# Patient Record
Sex: MALE | Race: WHITE | NOT HISPANIC OR LATINO
[De-identification: names, ages, dates, MRNs, and addresses within clinical notes are randomized per-mention and may not be internally consistent; named-entity substitution may affect disease eponyms.]

---

## 2021-02-05 PROBLEM — Z00.00 ENCOUNTER FOR PREVENTIVE HEALTH EXAMINATION: Status: ACTIVE | Noted: 2021-02-05

## 2021-02-09 ENCOUNTER — NON-APPOINTMENT (OUTPATIENT)
Age: 36
End: 2021-02-09

## 2021-02-10 ENCOUNTER — APPOINTMENT (OUTPATIENT)
Dept: CARDIOLOGY | Facility: CLINIC | Age: 36
End: 2021-02-10
Payer: COMMERCIAL

## 2021-02-10 ENCOUNTER — NON-APPOINTMENT (OUTPATIENT)
Age: 36
End: 2021-02-10

## 2021-02-10 ENCOUNTER — TRANSCRIPTION ENCOUNTER (OUTPATIENT)
Age: 36
End: 2021-02-10

## 2021-02-10 VITALS
SYSTOLIC BLOOD PRESSURE: 112 MMHG | DIASTOLIC BLOOD PRESSURE: 76 MMHG | BODY MASS INDEX: 26.9 KG/M2 | HEART RATE: 63 BPM | TEMPERATURE: 97.7 F | HEIGHT: 73 IN | WEIGHT: 203 LBS

## 2021-02-10 VITALS — SYSTOLIC BLOOD PRESSURE: 118 MMHG | DIASTOLIC BLOOD PRESSURE: 80 MMHG

## 2021-02-10 DIAGNOSIS — Z82.49 FAMILY HISTORY OF ISCHEMIC HEART DISEASE AND OTHER DISEASES OF THE CIRCULATORY SYSTEM: ICD-10-CM

## 2021-02-10 DIAGNOSIS — Z78.9 OTHER SPECIFIED HEALTH STATUS: ICD-10-CM

## 2021-02-10 DIAGNOSIS — Z86.59 PERSONAL HISTORY OF OTHER MENTAL AND BEHAVIORAL DISORDERS: ICD-10-CM

## 2021-02-10 PROCEDURE — 36415 COLL VENOUS BLD VENIPUNCTURE: CPT

## 2021-02-10 PROCEDURE — 99072 ADDL SUPL MATRL&STAF TM PHE: CPT

## 2021-02-10 PROCEDURE — 93000 ELECTROCARDIOGRAM COMPLETE: CPT | Mod: 59

## 2021-02-10 PROCEDURE — 99205 OFFICE O/P NEW HI 60 MIN: CPT

## 2021-02-10 PROCEDURE — 93246 EXT ECG>7D<15D RECORDING: CPT

## 2021-02-10 RX ORDER — BECLOMETHASONE DIPROPIONATE HFA 40 UG/1
40 AEROSOL, METERED RESPIRATORY (INHALATION)
Refills: 0 | Status: ACTIVE | COMMUNITY
Start: 2021-02-10

## 2021-02-10 RX ORDER — FINASTERIDE 1 MG/1
1 TABLET ORAL DAILY
Refills: 0 | Status: ACTIVE | COMMUNITY
Start: 2021-02-10

## 2021-02-10 NOTE — DISCUSSION/SUMMARY
[FreeTextEntry1] : .Brief recommendations and follow-up: (see above for details)\par \par The patient is describing palpitation which is not confirmed to be associated with a true arrhythmia.\par Work-up will commence with an event monitor.\par The patient will make efforts to obtain prior records.\par Comprehensive blood work to be drawn today.\par Further recommendations will follow.

## 2021-02-10 NOTE — HISTORY OF PRESENT ILLNESS
[FreeTextEntry1] : This 35 year-old male patient presents for cardiovascular evaluation.\par \par His problem list is as noted above.\par \par Patient presents for his first visit in many years, perhaps more than 10.\par \par He originally presented at the age of 24 with episodic palpitation.  At first the arrhythmia could not be detected but he then had an episode that produced lightheadedness and he presented to [facility] where supraventricular tachycardia was found.  Patient was transferred to [facility] and underwent electrophysiologic evaluation.  I have obtained the report dated 3/29/2010.  The patient had evidence of supraventricular tachycardia at a rate of 140 bpm.  He underwent ablation of the slow pathway.  Interestingly he also had a second arrhythmia termed an accelerated junctional rhythm.  He was treated briefly after the procedure with a beta-blocker but this was then discontinued.\par \par Over the years the patient is actually done very well.  He has noted episodic palpitation and has been under the care of a cardiologist when he was living in [facility].  He had testing that included a stress test that was evidently satisfactory with no evidence of recurrent arrhythmias.  By recollection the patient also had an echocardiogram that showed normal findings.  This report is not yet available for my review.\par \par Over the past year the patient has noted episodic palpitation that has been short-lived.  Over the past month however he has noted this almost daily.  Of note is that he has taken his pulse during the symptom and it was "normal".  A true arrhythmia that correlates with his symptomatology is not confirmed.\par \par The patient is concerned and now presents for consultation.\par \par The patient has been physically very fit.  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  There are no exertional symptoms.\par \par He is seen at the patient, family, and primary care physician request.

## 2021-02-10 NOTE — ASSESSMENT
[FreeTextEntry1] : EKG 2/10/2021.  Sinus rhythm.  Minor right-sided conduction delay.  Otherwise within normal limits.

## 2021-02-10 NOTE — PHYSICAL EXAM
[General Appearance - Well Developed] : well developed [Normal Appearance] : normal appearance [Well Groomed] : well groomed [General Appearance - Well Nourished] : well nourished [No Deformities] : no deformities [General Appearance - In No Acute Distress] : no acute distress [Normal Conjunctiva] : the conjunctiva exhibited no abnormalities [Eyelids - No Xanthelasma] : the eyelids demonstrated no xanthelasmas [Normal Oral Mucosa] : normal oral mucosa [No Oral Pallor] : no oral pallor [No Oral Cyanosis] : no oral cyanosis [Normal Jugular Venous A Waves Present] : normal jugular venous A waves present [Normal Jugular Venous V Waves Present] : normal jugular venous V waves present [No Jugular Venous Valdez A Waves] : no jugular venous valdez A waves [Heart Rate And Rhythm] : heart rate and rhythm were normal [Heart Sounds] : normal S1 and S2 [Murmurs] : no murmurs present [Respiration, Rhythm And Depth] : normal respiratory rhythm and effort [Exaggerated Use Of Accessory Muscles For Inspiration] : no accessory muscle use [Auscultation Breath Sounds / Voice Sounds] : lungs were clear to auscultation bilaterally [Abdomen Tenderness] : non-tender [Abdomen Soft] : soft [Abdomen Mass (___ Cm)] : no abdominal mass palpated [Abnormal Walk] : normal gait [Gait - Sufficient For Exercise Testing] : the gait was sufficient for exercise testing [Nail Clubbing] : no clubbing of the fingernails [Cyanosis, Localized] : no localized cyanosis [Petechial Hemorrhages (___cm)] : no petechial hemorrhages [] : no rash [Skin Color & Pigmentation] : normal skin color and pigmentation [No Venous Stasis] : no venous stasis [Skin Lesions] : no skin lesions [No Skin Ulcers] : no skin ulcer [No Xanthoma] : no  xanthoma was observed [Oriented To Time, Place, And Person] : oriented to person, place, and time [Affect] : the affect was normal [Mood] : the mood was normal [No Anxiety] : not feeling anxious

## 2021-02-11 LAB
ALBUMIN SERPL ELPH-MCNC: 4.7 G/DL
ALP BLD-CCNC: 82 U/L
ALT SERPL-CCNC: 18 U/L
ANION GAP SERPL CALC-SCNC: 11 MMOL/L
AST SERPL-CCNC: 18 U/L
BASOPHILS # BLD AUTO: 0.04 K/UL
BASOPHILS NFR BLD AUTO: 0.8 %
BILIRUB SERPL-MCNC: 0.3 MG/DL
BUN SERPL-MCNC: 19 MG/DL
CALCIUM SERPL-MCNC: 9.9 MG/DL
CHLORIDE SERPL-SCNC: 101 MMOL/L
CHOLEST SERPL-MCNC: 174 MG/DL
CK SERPL-CCNC: 139 U/L
CO2 SERPL-SCNC: 27 MMOL/L
CREAT SERPL-MCNC: 1.07 MG/DL
EOSINOPHIL # BLD AUTO: 0.2 K/UL
EOSINOPHIL NFR BLD AUTO: 3.9 %
FT4I SERPL CALC-MCNC: 6.7 INDEX
GLUCOSE SERPL-MCNC: 86 MG/DL
HCT VFR BLD CALC: 45.5 %
HDLC SERPL-MCNC: 53 MG/DL
HGB BLD-MCNC: 15.3 G/DL
IMM GRANULOCYTES NFR BLD AUTO: 0.2 %
LDLC SERPL CALC-MCNC: 97 MG/DL
LYMPHOCYTES # BLD AUTO: 2.58 K/UL
LYMPHOCYTES NFR BLD AUTO: 49.8 %
MAGNESIUM SERPL-MCNC: 2.1 MG/DL
MAN DIFF?: NORMAL
MCHC RBC-ENTMCNC: 32.3 PG
MCHC RBC-ENTMCNC: 33.6 GM/DL
MCV RBC AUTO: 96.2 FL
MONOCYTES # BLD AUTO: 0.43 K/UL
MONOCYTES NFR BLD AUTO: 8.3 %
NEUTROPHILS # BLD AUTO: 1.92 K/UL
NEUTROPHILS NFR BLD AUTO: 37 %
NONHDLC SERPL-MCNC: 122 MG/DL
PLATELET # BLD AUTO: 227 K/UL
POTASSIUM SERPL-SCNC: 4.3 MMOL/L
PROT SERPL-MCNC: 7 G/DL
RBC # BLD: 4.73 M/UL
RBC # FLD: 12.3 %
SODIUM SERPL-SCNC: 139 MMOL/L
T3FREE SERPL-MCNC: 2.55 PG/ML
T4 FREE SERPL-MCNC: 1.1 NG/DL
TRIGL SERPL-MCNC: 124 MG/DL
TSH SERPL-ACNC: 4.51 UIU/ML
WBC # FLD AUTO: 5.18 K/UL

## 2021-02-16 ENCOUNTER — TRANSCRIPTION ENCOUNTER (OUTPATIENT)
Age: 36
End: 2021-02-16

## 2021-03-18 ENCOUNTER — NON-APPOINTMENT (OUTPATIENT)
Age: 36
End: 2021-03-18

## 2021-03-31 ENCOUNTER — NON-APPOINTMENT (OUTPATIENT)
Age: 36
End: 2021-03-31

## 2021-04-01 ENCOUNTER — APPOINTMENT (OUTPATIENT)
Dept: CARDIOLOGY | Facility: CLINIC | Age: 36
End: 2021-04-01
Payer: COMMERCIAL

## 2021-04-01 VITALS
OXYGEN SATURATION: 7 % | DIASTOLIC BLOOD PRESSURE: 70 MMHG | BODY MASS INDEX: 26.51 KG/M2 | HEIGHT: 73 IN | HEART RATE: 56 BPM | WEIGHT: 200 LBS | TEMPERATURE: 97.8 F | RESPIRATION RATE: 13 BRPM | SYSTOLIC BLOOD PRESSURE: 114 MMHG

## 2021-04-01 DIAGNOSIS — J45.909 UNSPECIFIED ASTHMA, UNCOMPLICATED: ICD-10-CM

## 2021-04-01 DIAGNOSIS — Z92.89 PERSONAL HISTORY OF OTHER MEDICAL TREATMENT: ICD-10-CM

## 2021-04-01 DIAGNOSIS — Z01.89 ENCOUNTER FOR OTHER SPECIFIED SPECIAL EXAMINATIONS: ICD-10-CM

## 2021-04-01 PROCEDURE — 99213 OFFICE O/P EST LOW 20 MIN: CPT

## 2021-04-01 PROCEDURE — 99072 ADDL SUPL MATRL&STAF TM PHE: CPT

## 2021-04-01 NOTE — HISTORY OF PRESENT ILLNESS
[FreeTextEntry1] : This 35 year-old male patient presents for cardiovascular evaluation.\par \par His problem list is as noted above.\par \par

## 2021-04-01 NOTE — REASON FOR VISIT
[FreeTextEntry1] : Mr. BONNIE VALENTIN has the following problem list:\par \par Cardiac arrhythmia/palpitation/supraventricular tachycardia and accelerated AV junctional rhythm, status post ablation\par Asthma\par \par He has additional medical problems as noted.\par \par His primary care physician is: Cedric Harkins

## 2021-04-01 NOTE — DISCUSSION/SUMMARY
[FreeTextEntry1] : .Brief recommendations and follow-up: (see above for details)\par \par Patient's cardiovascular status is stable.\par As noted isolated atrial ectopy without evidence of recurrent supraventricular tachycardia.\par The patient will keep me posted and at this point we will remain available on request.

## 2021-07-29 ENCOUNTER — NON-APPOINTMENT (OUTPATIENT)
Age: 36
End: 2021-07-29

## 2021-07-29 ENCOUNTER — APPOINTMENT (OUTPATIENT)
Dept: CARDIOLOGY | Facility: CLINIC | Age: 36
End: 2021-07-29
Payer: COMMERCIAL

## 2021-07-29 VITALS
DIASTOLIC BLOOD PRESSURE: 64 MMHG | HEIGHT: 73 IN | WEIGHT: 195.38 LBS | TEMPERATURE: 97.8 F | BODY MASS INDEX: 25.89 KG/M2 | OXYGEN SATURATION: 97 % | SYSTOLIC BLOOD PRESSURE: 110 MMHG | HEART RATE: 64 BPM

## 2021-07-29 DIAGNOSIS — I47.1 SUPRAVENTRICULAR TACHYCARDIA: ICD-10-CM

## 2021-07-29 DIAGNOSIS — R00.2 PALPITATIONS: ICD-10-CM

## 2021-07-29 PROCEDURE — 93000 ELECTROCARDIOGRAM COMPLETE: CPT | Mod: 59

## 2021-07-29 PROCEDURE — 99214 OFFICE O/P EST MOD 30 MIN: CPT

## 2021-07-29 PROCEDURE — 93242 EXT ECG>48HR<7D RECORDING: CPT

## 2021-07-29 RX ORDER — MULTIVITAMIN
TABLET ORAL DAILY
Refills: 0 | Status: ACTIVE | COMMUNITY
Start: 2021-07-29

## 2021-07-30 PROBLEM — I47.1 PAROXYSMAL SUPRAVENTRICULAR TACHYCARDIA: Status: ACTIVE | Noted: 2021-02-09

## 2021-07-30 PROBLEM — R00.2 PALPITATIONS: Status: ACTIVE | Noted: 2021-07-30

## 2021-07-30 RX ORDER — DICLOFENAC SODIUM 50 MG/1
50 TABLET, DELAYED RELEASE ORAL
Qty: 60 | Refills: 0 | Status: COMPLETED | COMMUNITY
Start: 2021-06-04

## 2021-07-30 RX ORDER — BECLOMETHASONE DIPROPIONATE HFA 80 UG/1
80 AEROSOL, METERED RESPIRATORY (INHALATION)
Qty: 10 | Refills: 0 | Status: COMPLETED | COMMUNITY
Start: 2020-07-27

## 2021-07-30 NOTE — REASON FOR VISIT
[Other: ____] : [unfilled] [FreeTextEntry1] : Mr. Middleton presents with c/o frequent episodes of palpitations. He denies chest pain, SOB, dizziness or syncope. \par \par He brought his Kardia device. 3 readings were reviewed, which showed sinus rhythm HR 50-70s. No ectopy were noted.

## 2021-07-30 NOTE — HISTORY OF PRESENT ILLNESS
[FreeTextEntry1] : 35 year old male with history of paroxysmal supraventricular tachycardia s/p ablation on 3/29/2010, asthma, anxiety.

## 2021-07-30 NOTE — PHYSICAL EXAM
[No Acute Distress] : no acute distress [Normal S1, S2] : normal S1, S2 [No Murmur] : no murmur [No Rub] : no rub [Clear Lung Fields] : clear lung fields [Good Air Entry] : good air entry [No Respiratory Distress] : no respiratory distress  [Normal Gait] : normal gait [No Edema] : no edema [No Rash] : no rash [Moves all extremities] : moves all extremities [No Focal Deficits] : no focal deficits [Normal Speech] : normal speech [Alert and Oriented] : alert and oriented [Normal memory] : normal memory

## 2021-07-30 NOTE — CARDIOLOGY SUMMARY
[de-identified] : 7/29/21 Sinus rhythm HR 62\par 2/10/21 Sinus rhythm. Minor right sided conduction delay. Otherwise within normal limits.  [de-identified] : Zio monitor. Completed 2/24/21. Sinus rhythm. Rare APC with rare symptom correlation. Rare APC. All other diary entries were associated with sinus rhythm. [facility].  3/29/2010.  Dr. Maximus Harkins.  Supraventricular tachycardia and accelerated AV junctional rhythm.  Ablation of slow pathway. [de-identified] : Zio monitor. Completed 2/24/21. Sinus rhythm. Rare APC with rare symptom correlation. Rare APC. All other diary entries were associated with sinus rhythm.\[facility].  3/29/2010.  Dr. Maximus Harkins.  Supraventricular tachycardia and accelerated AV junctional rhythm.  Ablation of slow pathway.

## 2021-07-30 NOTE — REVIEW OF SYSTEMS
[Fever] : no fever [Headache] : no headache [Weight Gain (___ Lbs)] : no recent weight gain [Chills] : no chills [Feeling Fatigued] : not feeling fatigued [Weight Loss (___ Lbs)] : no recent weight loss [SOB] : no shortness of breath [Dyspnea on exertion] : not dyspnea during exertion [Chest Discomfort] : no chest discomfort [Lower Ext Edema] : no extremity edema [Palpitations] : palpitations [Syncope] : no syncope [Cough] : no cough [Rash] : no rash [Dizziness] : no dizziness [Confusion] : no confusion was observed

## 2022-07-27 ENCOUNTER — APPOINTMENT (OUTPATIENT)
Dept: CARDIOLOGY | Facility: CLINIC | Age: 37
End: 2022-07-27

## 2023-04-24 NOTE — ASSESSMENT
[FreeTextEntry1] : EKG 2/10/2021.  Sinus rhythm.  Minor right-sided conduction delay.  Otherwise within normal limits.
Detail Level: Detailed